# Patient Record
Sex: MALE | Race: BLACK OR AFRICAN AMERICAN | NOT HISPANIC OR LATINO | URBAN - METROPOLITAN AREA
[De-identification: names, ages, dates, MRNs, and addresses within clinical notes are randomized per-mention and may not be internally consistent; named-entity substitution may affect disease eponyms.]

---

## 2020-07-11 ENCOUNTER — OUTPATIENT (OUTPATIENT)
Dept: OUTPATIENT SERVICES | Facility: HOSPITAL | Age: 35
LOS: 1 days | End: 2020-07-11
Payer: MEDICAID

## 2020-07-11 DIAGNOSIS — Z11.59 ENCOUNTER FOR SCREENING FOR OTHER VIRAL DISEASES: ICD-10-CM

## 2020-07-11 PROCEDURE — U0003: CPT

## 2020-07-12 LAB — SARS-COV-2 RNA SPEC QL NAA+PROBE: SIGNIFICANT CHANGE UP

## 2020-07-15 ENCOUNTER — APPOINTMENT (OUTPATIENT)
Dept: NEUROLOGY | Facility: CLINIC | Age: 35
End: 2020-07-15

## 2021-03-23 ENCOUNTER — EMERGENCY (EMERGENCY)
Facility: HOSPITAL | Age: 36
LOS: 1 days | Discharge: HOME | End: 2021-03-23
Attending: EMERGENCY MEDICINE
Payer: MEDICAID

## 2021-03-23 VITALS — RESPIRATION RATE: 16 BRPM | HEART RATE: 93 BPM | OXYGEN SATURATION: 97 %

## 2021-03-23 VITALS
OXYGEN SATURATION: 100 % | RESPIRATION RATE: 20 BRPM | TEMPERATURE: 98 F | SYSTOLIC BLOOD PRESSURE: 124 MMHG | HEART RATE: 117 BPM | DIASTOLIC BLOOD PRESSURE: 69 MMHG

## 2021-03-23 PROCEDURE — 99285 EMERGENCY DEPT VISIT HI MDM: CPT | Mod: 25

## 2021-03-23 PROCEDURE — 99284 EMERGENCY DEPT VISIT MOD MDM: CPT

## 2021-03-23 PROCEDURE — 70450 CT HEAD/BRAIN W/O DYE: CPT

## 2021-03-23 PROCEDURE — 70450 CT HEAD/BRAIN W/O DYE: CPT | Mod: 26,MB

## 2021-03-23 PROCEDURE — 90715 TDAP VACCINE 7 YRS/> IM: CPT

## 2021-03-23 PROCEDURE — 90471 IMMUNIZATION ADMIN: CPT

## 2021-03-23 RX ORDER — TETANUS TOXOID, REDUCED DIPHTHERIA TOXOID AND ACELLULAR PERTUSSIS VACCINE, ADSORBED 5; 2.5; 8; 8; 2.5 [IU]/.5ML; [IU]/.5ML; UG/.5ML; UG/.5ML; UG/.5ML
0.5 SUSPENSION INTRAMUSCULAR ONCE
Refills: 0 | Status: COMPLETED | OUTPATIENT
Start: 2021-03-23 | End: 2021-03-23

## 2021-03-23 RX ADMIN — TETANUS TOXOID, REDUCED DIPHTHERIA TOXOID AND ACELLULAR PERTUSSIS VACCINE, ADSORBED 0.5 MILLILITER(S): 5; 2.5; 8; 8; 2.5 SUSPENSION INTRAMUSCULAR at 20:15

## 2021-03-23 NOTE — ED PROVIDER NOTE - PHYSICAL EXAMINATION
General: In NAD.  Head: NC. Small areas of bruising to b/l temples. Approx. 1.5cm x 1.5cm abrasion in left eyebrow with associated hematoma. No orbital tenderness. No maxillofacial tenderness.   Eyes: See above. No raccoon eyes. PERRLA, EOMI, no nystagmus.  Ears: No rich signs or hemotympanum b/l.  Mouth: No dental injuries.  Neck: No abrasions or ecchymosis. Supple, no midline/paraspinal tenderness to palpation. No bony step offs. FROM.  Cardiac: Rate and rhythm regular, S1 & S2 clear. No audible murmur, gallop, or rub.   Chest/Lungs: No deformity, ecchymosis, abrasions. Normal AP to lateral diameter. Symmetrical excursion b/l. No chest wall tenderness. Breath sounds vesicular, symmetrical and without rales, rhonchi or wheezing b/l.   Abdomen: Soft, non-tender, non-distended, no masses palpated.  Extremities: Atraumatic. No deformity. Pelvis stable. FROM.  Neuro: GCS 15. A&Ox3. CN II-XII grossly intact. Clear speech, steady gait, cerebellar intact, no focal deficits. Motor intact. Sensation intact to b/l upper and lower extremities.  Psych: Normal mood and affect. No apparent risk to self or others.
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
## 2021-03-23 NOTE — ED PROVIDER NOTE - PATIENT PORTAL LINK FT
You can access the FollowMyHealth Patient Portal offered by [facility] by registering at the following website: [email or web address]. By joining [employer]’s FollowMyHealth portal, you will also be able to view your health information using other applications (apps) compatible with our system.
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
## 2022-01-05 ENCOUNTER — TRANSCRIPTION ENCOUNTER (OUTPATIENT)
Age: 37
End: 2022-01-05

## 2022-07-28 NOTE — ED PROVIDER NOTE - NSFOLLOWUPINSTRUCTIONS_ED_ALL_ED_FT
Patient Refused
- Ibuprofen/acetaminophen for pain.  - Ice.  - Sleep with head of bed elevated if possible to help with swelling.   - Keep wound clean using soap and water. Apply Bacitracin.   - Please bring all documentation from your ED visit to any related future follow up appointment.  - Please call to schedule follow up appointment with your primary care physician within 24-48 hours.  - Please seek immediate medical attention for any new/worsening, signs/symptoms, or concerns.    Feel better!

## 2025-03-11 ENCOUNTER — APPOINTMENT (OUTPATIENT)
Dept: GASTROENTEROLOGY | Facility: CLINIC | Age: 40
End: 2025-03-11